# Patient Record
Sex: MALE | Race: WHITE
[De-identification: names, ages, dates, MRNs, and addresses within clinical notes are randomized per-mention and may not be internally consistent; named-entity substitution may affect disease eponyms.]

---

## 2019-04-01 ENCOUNTER — HOSPITAL ENCOUNTER (EMERGENCY)
Dept: HOSPITAL 62 - ER | Age: 54
Discharge: ANOTHER HOSPITAL | End: 2019-04-01
Payer: SELF-PAY

## 2019-04-01 VITALS — DIASTOLIC BLOOD PRESSURE: 93 MMHG | SYSTOLIC BLOOD PRESSURE: 139 MMHG

## 2019-04-01 DIAGNOSIS — I10: ICD-10-CM

## 2019-04-01 DIAGNOSIS — I25.10: ICD-10-CM

## 2019-04-01 DIAGNOSIS — R07.9: Primary | ICD-10-CM

## 2019-04-01 DIAGNOSIS — R06.4: ICD-10-CM

## 2019-04-01 DIAGNOSIS — I25.84: ICD-10-CM

## 2019-04-01 LAB
ADD MANUAL DIFF: NO
ALBUMIN SERPL-MCNC: 4.4 G/DL (ref 3.5–5)
ALP SERPL-CCNC: 50 U/L (ref 38–126)
ALT SERPL-CCNC: 38 U/L (ref 21–72)
ANION GAP SERPL CALC-SCNC: 8 MMOL/L (ref 5–19)
APPEARANCE UR: CLEAR
APTT PPP: YELLOW S
AST SERPL-CCNC: 23 U/L (ref 17–59)
BASOPHILS # BLD AUTO: 0 10^3/UL (ref 0–0.2)
BASOPHILS NFR BLD AUTO: 0.3 % (ref 0–2)
BILIRUB DIRECT SERPL-MCNC: 0.2 MG/DL (ref 0–0.4)
BILIRUB SERPL-MCNC: 0.3 MG/DL (ref 0.2–1.3)
BILIRUB UR QL STRIP: NEGATIVE
BUN SERPL-MCNC: 15 MG/DL (ref 7–20)
CALCIUM: 10.3 MG/DL (ref 8.4–10.2)
CHLORIDE SERPL-SCNC: 106 MMOL/L (ref 98–107)
CK MB SERPL-MCNC: 2.03 NG/ML (ref ?–4.55)
CK SERPL-CCNC: 171 U/L (ref 55–170)
CO2 SERPL-SCNC: 24 MMOL/L (ref 22–30)
EOSINOPHIL # BLD AUTO: 0.1 10^3/UL (ref 0–0.6)
EOSINOPHIL NFR BLD AUTO: 1 % (ref 0–6)
ERYTHROCYTE [DISTWIDTH] IN BLOOD BY AUTOMATED COUNT: 13.5 % (ref 11.5–14)
GLUCOSE SERPL-MCNC: 135 MG/DL (ref 75–110)
GLUCOSE UR STRIP-MCNC: NEGATIVE MG/DL
HCT VFR BLD CALC: 42.6 % (ref 37.9–51)
HGB BLD-MCNC: 14.4 G/DL (ref 13.5–17)
KETONES UR STRIP-MCNC: NEGATIVE MG/DL
LIPASE SERPL-CCNC: 316 U/L (ref 23–300)
LYMPHOCYTES # BLD AUTO: 2.1 10^3/UL (ref 0.5–4.7)
LYMPHOCYTES NFR BLD AUTO: 14.8 % (ref 13–45)
MCH RBC QN AUTO: 30.6 PG (ref 27–33.4)
MCHC RBC AUTO-ENTMCNC: 33.7 G/DL (ref 32–36)
MCV RBC AUTO: 91 FL (ref 80–97)
MONOCYTES # BLD AUTO: 0.4 10^3/UL (ref 0.1–1.4)
MONOCYTES NFR BLD AUTO: 2.8 % (ref 3–13)
NEUTROPHILS # BLD AUTO: 11.3 10^3/UL (ref 1.7–8.2)
NEUTS SEG NFR BLD AUTO: 81.1 % (ref 42–78)
NITRITE UR QL STRIP: NEGATIVE
PH UR STRIP: 5 [PH] (ref 5–9)
PLATELET # BLD: 374 10^3/UL (ref 150–450)
POTASSIUM SERPL-SCNC: 4.4 MMOL/L (ref 3.6–5)
PROT SERPL-MCNC: 7.6 G/DL (ref 6.3–8.2)
PROT UR STRIP-MCNC: NEGATIVE MG/DL
RBC # BLD AUTO: 4.68 10^6/UL (ref 4.35–5.55)
SODIUM SERPL-SCNC: 137.9 MMOL/L (ref 137–145)
SP GR UR STRIP: 1.03
TOTAL CELLS COUNTED % (AUTO): 100 %
TROPONIN I SERPL-MCNC: < 0.012 NG/ML
UROBILINOGEN UR-MCNC: NEGATIVE MG/DL (ref ?–2)
WBC # BLD AUTO: 13.9 10^3/UL (ref 4–10.5)

## 2019-04-01 PROCEDURE — 96361 HYDRATE IV INFUSION ADD-ON: CPT

## 2019-04-01 PROCEDURE — 99285 EMERGENCY DEPT VISIT HI MDM: CPT

## 2019-04-01 PROCEDURE — 96375 TX/PRO/DX INJ NEW DRUG ADDON: CPT

## 2019-04-01 PROCEDURE — 82553 CREATINE MB FRACTION: CPT

## 2019-04-01 PROCEDURE — 96376 TX/PRO/DX INJ SAME DRUG ADON: CPT

## 2019-04-01 PROCEDURE — 78226 HEPATOBILIARY SYSTEM IMAGING: CPT

## 2019-04-01 PROCEDURE — 84484 ASSAY OF TROPONIN QUANT: CPT

## 2019-04-01 PROCEDURE — 85025 COMPLETE CBC W/AUTO DIFF WBC: CPT

## 2019-04-01 PROCEDURE — 96374 THER/PROPH/DIAG INJ IV PUSH: CPT

## 2019-04-01 PROCEDURE — 36415 COLL VENOUS BLD VENIPUNCTURE: CPT

## 2019-04-01 PROCEDURE — 93010 ELECTROCARDIOGRAM REPORT: CPT

## 2019-04-01 PROCEDURE — 82550 ASSAY OF CK (CPK): CPT

## 2019-04-01 PROCEDURE — 80053 COMPREHEN METABOLIC PANEL: CPT

## 2019-04-01 PROCEDURE — 81001 URINALYSIS AUTO W/SCOPE: CPT

## 2019-04-01 PROCEDURE — 71045 X-RAY EXAM CHEST 1 VIEW: CPT

## 2019-04-01 PROCEDURE — 76705 ECHO EXAM OF ABDOMEN: CPT

## 2019-04-01 PROCEDURE — 83690 ASSAY OF LIPASE: CPT

## 2019-04-01 PROCEDURE — 71275 CT ANGIOGRAPHY CHEST: CPT

## 2019-04-01 PROCEDURE — 93005 ELECTROCARDIOGRAM TRACING: CPT

## 2019-04-01 PROCEDURE — A9537 TC99M MEBROFENIN: HCPCS

## 2019-04-01 NOTE — RADIOLOGY REPORT (SQ)
EXAM DESCRIPTION:  CTA CHEST



COMPLETED DATE/TIME:  4/1/2019 9:42 am



REASON FOR STUDY:  Substernal upper back,cool,clammy, unequal radial



COMPARISON:  AP chest 4/1/2019



TECHNIQUE:  CT scan of the chest performed using helical scanning technique with dynamic intravenous 
contrast injection.  Images reviewed with lung, soft tissue and bone windows.  Reconstructed coronal 
and sagittal MPR images reviewed.

Additional 3 dimensional post-processing performed to develop Maximal Intensity Projection images (MI
P).  All images stored on PACS.

All CT scanners at this facility use dose modulation, iterative reconstruction, and/or weight based d
osing when appropriate to reduce radiation dose to as low as reasonably achievable (ALARA).

[facility]: Dose Right  [facility]: CareDose    [facility]: Dose Right    [facility]: Teradose 4D    [facility]: Smart Technologies



CONTRAST TYPE AND DOSE:  contrast/concentration: Isovue 350.00 mg/ml; Total Contrast Delivered: 70.0 
ml; Total Saline Delivered: 60.0 ml

Contrast bolus optimized for the thoracic aorta.  Suboptimal contrast bolus for pulmonary emboli



RENAL FUNCTION:  Deferred due to severity of the patient's condition



RADIATION DOSE:  CT Rad equipment meets quality standard of care and radiation dose reduction techniq
ues were employed. CTDIvol: 21.2 - 29.8 mGy. DLP: 849 mGy-cm. .



LIMITATIONS:  None.



FINDINGS:  LUNGS AND PLEURA: No masses, infiltrates, or pneumothorax.  No pleural effusions or pleura
l calcifications.

AORTA AND GREAT VESSELS: No thoracic aortic aneurysm.  No thoracic aortic dissection.

HEART: No pericardial effusion. Proximal LAD coronary artery calcification, axial images 65-67.

PULMONARY ARTERIES: Bolus not timed for pulmonary artery evaluation.

HILAR AND MEDIASTINAL STRUCTURES: No identified masses or abnormal nodes.

HARDWARE: None in the chest.

UPPER ABDOMEN: Multiple calcified stones in the gallbladder without pericholecystic fluid.  Subcentim
eter cysts in the left and right lobe liver.

THYROID AND OTHER SOFT TISSUES: Mild gynecomastia

BONES: No acute or significant finding.

3D MIPS: Confirm above findings.

OTHER: No other significant finding.



IMPRESSION:  No CT angio evidence of thoracic aortic dissection.  Results discussed with Dr. Rojas, 0
940 hours 4/1/2019.



COMMENT:  Quality ID # 436: Final reports with documentation of one or more dose reduction techniques
 (e.g., Automated exposure control, adjustment of the mA and/or kV according to patient size, use of 
iterative reconstruction technique)



TECHNICAL DOCUMENTATION:  JOB ID:  [id number]

 2011 [employer]- All Rights Reserved



Reading location - IP/workstation name: [email or web address]

## 2019-04-01 NOTE — RADIOLOGY REPORT (SQ)
EXAM DESCRIPTION:  CHEST SINGLE VIEW



COMPLETED DATE/TIME:  4/1/2019 9:25 am



REASON FOR STUDY:  CHEST PAIN



COMPARISON:  CT ANGIO CHEST SAME DATE



EXAM PARAMETERS:  NUMBER OF VIEWS: One view.

TECHNIQUE: Single frontal radiographic view of the chest acquired.

RADIATION DOSE: NA

LIMITATIONS: None.



FINDINGS:  LUNGS AND PLEURA: No opacities, masses or pneumothorax. No pleural effusion.

MEDIASTINUM AND HILAR STRUCTURES: No masses.  Contour normal.

HEART AND VASCULAR STRUCTURES: Heart normal in size.  Normal vasculature.

BONES: No acute findings.

HARDWARE: None in the chest.

OTHER: No other significant finding.



IMPRESSION:  NO ACUTE RADIOGRAPHIC FINDING IN THE CHEST.



TECHNICAL DOCUMENTATION:  JOB ID:  [id number]

 2011 [employer]- All Rights Reserved



Reading location - IP/workstation name: [email or web address]

## 2019-04-01 NOTE — ER DOCUMENT REPORT
Entered by DAMON STILES SCRIBE  04/01/19 0918 





Acting as scribe for:HENRY MUJICA MD





ED General





- General


Chief Complaint: Chest Pain > 30


Stated Complaint: CHEST PAIN


Time Seen by Provider: 04/01/19 09:04


Mode of Arrival: Ambulatory


Information source: Patient


Notes: 


Patient is a 54 year old male with a prior history of drug abuse presents to the

emergency department complaining of chest pain onset around 0600 this morning. 

Patient describes the pain as severe that is located substernally and radiates 

into his upper back. He states he had identical symptoms a few weeks ago that 

resolved after a few hours. He denies any exacerbating or relieving factors. 








When asked about heartburn, he denies this while his wife states he has been 

taking her heartburn medications recently and he was taking a lot of Tums last 

night.





Patient states the last time he saw a doctor was when he came to the emergency 

room here on 7/10/2016 for foreign body to the eye.  He takes no regular 

medications.





The patient reports that he used to abuse crack cocaine and methamphetamine 

regularly.  He states he has not used anything since 2016.  When he was seen 

here in July 2016, the chart noted that he was "in recovery" at that time.


TRAVEL OUTSIDE OF THE [place] IN LAST 30 DAYS: No





- Related Data


Allergies/Adverse Reactions: 


                                        





No Known Allergies Allergy (Verified 04/01/19 08:39)


   











Past Medical History





- General


Information source: Patient





- Social History


Smoking Status: Former Smoker


Cigarette use (# per day): No


Chew tobacco use (# tins/day): No


Smoking Education Provided: No


Frequency of alcohol use: None


Family History: Reviewed & Not Pertinent


Psychiatric Medical History: Reports: Hx Bipolar Disorder





Review of Systems





- Review of Systems


Constitutional: No symptoms reported


EENT: No symptoms reported


Cardiovascular: See HPI, Chest pain


Respiratory: No symptoms reported


Gastrointestinal: See HPI


Genitourinary: No symptoms reported


Musculoskeletal: No symptoms reported


Skin: No symptoms reported


Hematologic/Lymphatic: No symptoms reported


Neurological/Psychological: No symptoms reported


-: Yes All other systems reviewed and negative





Physical Exam





- Vital signs


Vitals: 


                                        











Temp Pulse Resp BP Pulse Ox


 


 97.3 F   58 L  18   163/82 H  100 


 


 04/01/19 08:46  04/01/19 08:46  04/01/19 08:46  04/01/19 08:46  04/01/19 08:46














- Notes


Notes: 


 


GENERAL: Alert, appears uncomfortable, writhing in bed. Hyperventilating. 


HEAD: Normocephalic, atraumatic.


EYES: Pupils equal, round, and reactive to light. Extraocular movements intact.


ENT: Oral mucosa moist, tongue midline. 


NECK: Full range of motion. Supple. Trachea midline.


LUNGS: Hyperventilating. Clear to auscultation bilaterally, no wheezes, rales, 

or rhonchi. No respiratory distress. No tenderness to chest wall. 


HEART: Regular rate and rhythm. No murmurs, gallops, or rubs.


ABDOMEN: Soft, No RUQ tenderness to deep palpation. Non-distended. Bowel sounds 

present in all 4 quadrants. No guarding, rigidity, or rebound.


EXTREMITIES: Moves all 4 extremities spontaneously. Right radial pulse is weaker

on the right than left.


NEUROLOGICAL: Alert and oriented x3. Normal speech. 


PSYCH: Normal affect, normal mood.


SKIN: Cool, clammy, diaphoretic. 


BACK: No tenderness to palpation.








Course





- Re-evaluation


Re-evalutation: 





04/01/19 10:22


The patient CTA chest did not show an explanation for his discomfort.  There was

no aortic dissection.  There was noted to be significant calcification to the 

proximal LAD coronary artery.  He also had multiple large gallstones without 

pericholecystic fluid or gallbladder wall thickening.


He does not show any tenderness to right upper quadrant abdomen palpation.  His 

EKG does not show ischemic changes.





I did place a call to the [facility] cardiac connection.  We will send the CT images

along with the demographic page and she will have 1 of their cardiologist look 

at the scans.


04/01/19 11:08


I discussed the case with the responding cardiologist at [facility].  He felt that 

since there are no EKG changes, no troponin elevation, that he should be seen by

cardiologist here and would be happy to take the patient for catheterization if 

the local cardiologist felt it should be done.





I immediately called Dr. Gonzalez and discussed the case with him.  He is in 

his office and will pull up the CT films and look at them and call me back.





04/01/19 12:29


Dr. Patton did call me back and felt that the symptoms were not likely cardiac.  I

did have a gallbladder ultrasound showing multiple large stones but no 

pericholecystic fluid or gallbladder wall thickening.  He requests that I do a 

HIDA scan to see if that is where his symptoms are coming from.


The patient stated that the GI cocktail did not improve his symptoms at all, 

only the pain medications.





04/01/19 14:33


Unable to do Hida scan due to the narcotic management of his pain.  I discussed 

this with Dr. Gonzalez, and at that point he stated the patient should 

probably go to [facility] and get a catheterization.





04/01/19 14:39


I did place a call to refer direct, and they are going to talk with the cardiac 

connection and then call me back.





04/01/19 14:53


The cardiac fellow with cardiac connection called back and we discussed the case

again.  He wanted to know why I was consulting him, and I told him I had called 

this time requesting to speak with the hospitalist.  They are going to page the 

hospitalist at this time.





04/01/19 15:14


Referred direct called back and stated a Dr. Whitaker would accept the patient 

on the hospitalist service.





- Vital Signs


Vital signs: 


                                        











Temp Pulse Resp BP Pulse Ox


 


 97.3 F   58 L  15   137/58 H  96 


 


 04/01/19 08:46  04/01/19 08:46  04/01/19 13:03  04/01/19 13:03  04/01/19 13:03














- Laboratory


Result Diagrams: 


                                 04/01/19 09:00





                                 04/01/19 09:00


Laboratory results interpreted by me: 


                                        











  04/01/19 04/01/19 04/01/19





  09:00 09:00 09:00


 


WBC  13.9 H  


 


Seg Neutrophils %  81.1 H  


 


Monocytes %  2.8 L  


 


Absolute Neutrophils  11.3 H  


 


Glucose   135 H 


 


Calcium   10.3 H 


 


Creatine Kinase   171 H 


 


Lipase    316.0 H














- Diagnostic Test


Radiology reviewed: Image reviewed, Reports reviewed - CTA of the chest does not

show aortic dissection.  There is areas of heavy calcification in the left 

anterior descending coronary artery.  There are multiple large calcified 

gallstones in the gallbladder. The gallbladder ultrasound has a negative Lal 

sign, multiple large stones, no pericholecystic fluid or gallbladder wall 

thickening.





- EKG Interpretation by Me


EKG shows normal: Sinus rhythm, Axis, Intervals, QRS Complexes, ST-T Waves


Rate: Normal - 63


Rhythm: NSR





- Transfer of Care


Care transferred to following provider: Dr. Bay


Notes: 





04/01/19 15:53


Patient is currently waiting on bed assignment at [facility].





Discharge





- Discharge


Clinical Impression: 


Chest pain


Qualifiers:


 Chest pain type: unspecified Qualified Code(s): R07.9 - Chest pain, unspecified





Coronary artery disease


Qualifiers:


 Coronary Disease-Associated Artery/Lesion type: due to calcified coronary 

lesion Qualified Code(s): I25.10 - Atherosclerotic heart disease of native 

coronary artery without angina pectoris; I25.84 - Coronary atherosclerosis due 

to calcified coronary lesion





High blood pressure


Qualifiers:


 Hypertension type: essential hypertension Qualified Code(s): I10 - Essential 

(primary) hypertension





Condition: Stable


Disposition: [facility]





I personally performed the services described in the documentation, reviewed and

edited the documentation which was dictated to the scribe in my presence, and it

accurately records my words and actions.

## 2019-04-01 NOTE — RADIOLOGY REPORT (SQ)
EXAM DESCRIPTION:  NM HIDA SCAN



COMPLETED DATE/TIME:  4/1/2019 7:19 pm



REASON FOR STUDY:  Multiple gallstones, severe chest pain



COMPARISON:  Ultrasound abdomen



RADIONUCLIDE AND DOSE:  DOSAGE RADIONUCLIDE: 5.45 millicuries Tc99m Mebrofenin.

DOSAGE MORPHINE: Not required.

The route of agent administration: Intravenous



TECHNIQUE:  Serial imaging right upper quadrant up to 60 minutes following injection of radionuclide.
 Patient imaged AP and Right Lateral.



LIMITATIONS:  None.



FINDINGS:  LIVER: Normal visualization without areas of photopenia.

INTRA-HEPATIC BILE DUCTS: Temporal visualization normal. No dilatation.

COMMON BILE DUCT: Normal without dilatation or delayed visualization.

GALLBLADDER: Normal visualization.

OTHER: No other significant finding.



IMPRESSION:  NORMAL STUDY WITHOUT CYSTIC OR COMMON DUCT OBSTRUCTION.



TECHNICAL DOCUMENTATION:  JOB ID:  [id number]

 2011 [employer]- All Rights Reserved



Reading location - IP/workstation name: [email or web address]

## 2019-04-01 NOTE — RADIOLOGY REPORT (SQ)
EXAM DESCRIPTION:  U/S ABDOMEN LIMITED W/O DOP



COMPLETED DATE/TIME:  4/1/2019 11:52 am



REASON FOR STUDY:  Multiple large stones.  Chest pain.



COMPARISON:  None.



TECHNIQUE:  Dynamic and static grayscale images acquired of the abdomen and recorded on PACS. Additio
pavan selected color Doppler and spectral images recorded.



LIMITATIONS:  None.



FINDINGS:  PANCREAS: No masses.  Visualized pancreatic duct normal caliber.

LIVER: No masses.  Increased echogenicity.  Echotexture normal.

LIVER VASCULATURE: Normal directional flow of the main portal vein and hepatic veins.

GALLBLADDER: Multiple large gallstones.  Normal wall thickness. No pericholecystic fluid.

ULTRASOUND-DETECTED LAL'S SIGN: Negative.

INTRAHEPATIC DUCTS AND COMMON DUCT: CBD and intrahepatic ducts normal caliber. No filling defects.

INFERIOR VENA CAVA: Normal flow.

AORTA: No aneurysm.

RIGHT KIDNEY:  Normal size. Normal echogenicity. No solid or suspicious masses. No hydronephrosis. No
 calcifications.

PERITONEAL AND RIGHT PLEURAL SPACE: No ascites or effusions.

OTHER: No other significant findings.



IMPRESSION:  1.  Multiple large gallstones without evidence of acute cholecystitis.  No gallbladder w
all thickening.  No pericholecystic fluid.  Negative sonographic Lal's sign.

2.  Hepatic steatosis.



TECHNICAL DOCUMENTATION:  JOB ID:  [id number]

 2011 [email or web address]- All Rights Reserved



Reading location - IP/workstation name: [email or web address]